# Patient Record
Sex: MALE | Race: WHITE | NOT HISPANIC OR LATINO | Employment: STUDENT | URBAN - METROPOLITAN AREA
[De-identification: names, ages, dates, MRNs, and addresses within clinical notes are randomized per-mention and may not be internally consistent; named-entity substitution may affect disease eponyms.]

---

## 2022-01-08 ENCOUNTER — HOSPITAL ENCOUNTER (EMERGENCY)
Facility: HOSPITAL | Age: 22
Discharge: HOME | End: 2022-01-08
Attending: EMERGENCY MEDICINE | Admitting: EMERGENCY MEDICINE

## 2022-01-08 ENCOUNTER — APPOINTMENT (OUTPATIENT)
Dept: CT IMAGING | Facility: HOSPITAL | Age: 22
End: 2022-01-08

## 2022-01-08 VITALS
SYSTOLIC BLOOD PRESSURE: 118 MMHG | HEART RATE: 82 BPM | DIASTOLIC BLOOD PRESSURE: 59 MMHG | TEMPERATURE: 99.2 F | BODY MASS INDEX: 22.43 KG/M2 | RESPIRATION RATE: 16 BRPM | OXYGEN SATURATION: 100 % | HEIGHT: 66 IN | WEIGHT: 139.55 LBS

## 2022-01-08 DIAGNOSIS — R19.7 DIARRHEA, UNSPECIFIED TYPE: ICD-10-CM

## 2022-01-08 DIAGNOSIS — K52.9 ENTERITIS: Primary | ICD-10-CM

## 2022-01-08 DIAGNOSIS — R10.84 GENERALIZED ABDOMINAL PAIN: ICD-10-CM

## 2022-01-08 LAB
ALBUMIN SERPL-MCNC: 3.7 G/DL (ref 3.5–5.2)
ALBUMIN/GLOB SERPL: 1.3 G/DL
ALP SERPL-CCNC: 85 U/L (ref 39–117)
ALT SERPL W P-5'-P-CCNC: 11 U/L (ref 1–41)
ANION GAP SERPL CALCULATED.3IONS-SCNC: 12 MMOL/L (ref 5–15)
AST SERPL-CCNC: 13 U/L (ref 1–40)
B PARAPERT DNA SPEC QL NAA+PROBE: NOT DETECTED
B PERT DNA SPEC QL NAA+PROBE: NOT DETECTED
BASOPHILS # BLD AUTO: 0 10*3/MM3 (ref 0–0.2)
BASOPHILS NFR BLD AUTO: 0.7 % (ref 0–1.5)
BILIRUB SERPL-MCNC: 0.2 MG/DL (ref 0–1.2)
BILIRUB UR QL STRIP: NEGATIVE
BUN SERPL-MCNC: 10 MG/DL (ref 6–20)
BUN/CREAT SERPL: 15.6 (ref 7–25)
C PNEUM DNA NPH QL NAA+NON-PROBE: NOT DETECTED
CALCIUM SPEC-SCNC: 8.8 MG/DL (ref 8.6–10.5)
CHLORIDE SERPL-SCNC: 102 MMOL/L (ref 98–107)
CLARITY UR: CLEAR
CO2 SERPL-SCNC: 25 MMOL/L (ref 22–29)
COLOR UR: YELLOW
CREAT SERPL-MCNC: 0.64 MG/DL (ref 0.76–1.27)
DEPRECATED RDW RBC AUTO: 40.7 FL (ref 37–54)
EOSINOPHIL # BLD AUTO: 0 10*3/MM3 (ref 0–0.4)
EOSINOPHIL NFR BLD AUTO: 0.4 % (ref 0.3–6.2)
ERYTHROCYTE [DISTWIDTH] IN BLOOD BY AUTOMATED COUNT: 12.8 % (ref 12.3–15.4)
FLUAV SUBTYP SPEC NAA+PROBE: NOT DETECTED
FLUBV RNA ISLT QL NAA+PROBE: NOT DETECTED
GFR SERPL CREATININE-BSD FRML MDRD: >150 ML/MIN/1.73
GLOBULIN UR ELPH-MCNC: 2.9 GM/DL
GLUCOSE SERPL-MCNC: 93 MG/DL (ref 65–99)
GLUCOSE UR STRIP-MCNC: NEGATIVE MG/DL
HADV DNA SPEC NAA+PROBE: NOT DETECTED
HCOV 229E RNA SPEC QL NAA+PROBE: NOT DETECTED
HCOV HKU1 RNA SPEC QL NAA+PROBE: NOT DETECTED
HCOV NL63 RNA SPEC QL NAA+PROBE: NOT DETECTED
HCOV OC43 RNA SPEC QL NAA+PROBE: NOT DETECTED
HCT VFR BLD AUTO: 42.2 % (ref 37.5–51)
HGB BLD-MCNC: 14.8 G/DL (ref 13–17.7)
HGB UR QL STRIP.AUTO: NEGATIVE
HMPV RNA NPH QL NAA+NON-PROBE: NOT DETECTED
HOLD SPECIMEN: NORMAL
HPIV1 RNA ISLT QL NAA+PROBE: NOT DETECTED
HPIV2 RNA SPEC QL NAA+PROBE: NOT DETECTED
HPIV3 RNA NPH QL NAA+PROBE: NOT DETECTED
HPIV4 P GENE NPH QL NAA+PROBE: NOT DETECTED
KETONES UR QL STRIP: NEGATIVE
LEUKOCYTE ESTERASE UR QL STRIP.AUTO: NEGATIVE
LYMPHOCYTES # BLD AUTO: 0.9 10*3/MM3 (ref 0.7–3.1)
LYMPHOCYTES NFR BLD AUTO: 18.9 % (ref 19.6–45.3)
M PNEUMO IGG SER IA-ACNC: NOT DETECTED
MCH RBC QN AUTO: 31.2 PG (ref 26.6–33)
MCHC RBC AUTO-ENTMCNC: 35.1 G/DL (ref 31.5–35.7)
MCV RBC AUTO: 88.9 FL (ref 79–97)
MONOCYTES # BLD AUTO: 0.8 10*3/MM3 (ref 0.1–0.9)
MONOCYTES NFR BLD AUTO: 16.8 % (ref 5–12)
NEUTROPHILS NFR BLD AUTO: 3 10*3/MM3 (ref 1.7–7)
NEUTROPHILS NFR BLD AUTO: 63.2 % (ref 42.7–76)
NITRITE UR QL STRIP: NEGATIVE
NRBC BLD AUTO-RTO: 0.1 /100 WBC (ref 0–0.2)
PH UR STRIP.AUTO: 5.5 [PH] (ref 5–8)
PLATELET # BLD AUTO: 217 10*3/MM3 (ref 140–450)
PMV BLD AUTO: 7.1 FL (ref 6–12)
POTASSIUM SERPL-SCNC: 4 MMOL/L (ref 3.5–5.2)
PROT SERPL-MCNC: 6.6 G/DL (ref 6–8.5)
PROT UR QL STRIP: NEGATIVE
RBC # BLD AUTO: 4.75 10*6/MM3 (ref 4.14–5.8)
RHINOVIRUS RNA SPEC NAA+PROBE: NOT DETECTED
RSV RNA NPH QL NAA+NON-PROBE: NOT DETECTED
SARS-COV-2 RNA NPH QL NAA+NON-PROBE: NOT DETECTED
SODIUM SERPL-SCNC: 139 MMOL/L (ref 136–145)
SP GR UR STRIP: 1.03 (ref 1–1.03)
UROBILINOGEN UR QL STRIP: ABNORMAL
WBC NRBC COR # BLD: 4.8 10*3/MM3 (ref 3.4–10.8)
WHOLE BLOOD HOLD SPECIMEN: NORMAL

## 2022-01-08 PROCEDURE — 81003 URINALYSIS AUTO W/O SCOPE: CPT | Performed by: EMERGENCY MEDICINE

## 2022-01-08 PROCEDURE — 99283 EMERGENCY DEPT VISIT LOW MDM: CPT

## 2022-01-08 PROCEDURE — 85025 COMPLETE CBC W/AUTO DIFF WBC: CPT | Performed by: EMERGENCY MEDICINE

## 2022-01-08 PROCEDURE — 80053 COMPREHEN METABOLIC PANEL: CPT | Performed by: EMERGENCY MEDICINE

## 2022-01-08 PROCEDURE — 0202U NFCT DS 22 TRGT SARS-COV-2: CPT | Performed by: EMERGENCY MEDICINE

## 2022-01-08 PROCEDURE — 0 IOPAMIDOL PER 1 ML: Performed by: EMERGENCY MEDICINE

## 2022-01-08 PROCEDURE — 74177 CT ABD & PELVIS W/CONTRAST: CPT

## 2022-01-08 RX ORDER — SODIUM CHLORIDE 0.9 % (FLUSH) 0.9 %
10 SYRINGE (ML) INJECTION AS NEEDED
Status: DISCONTINUED | OUTPATIENT
Start: 2022-01-08 | End: 2022-01-08 | Stop reason: HOSPADM

## 2022-01-08 RX ADMIN — IOPAMIDOL 100 ML: 755 INJECTION, SOLUTION INTRAVENOUS at 13:52

## 2022-01-08 RX ADMIN — SODIUM CHLORIDE 1000 ML: 9 INJECTION, SOLUTION INTRAVENOUS at 12:33

## 2022-01-08 NOTE — ED NOTES
Pt c/o abdominal pain, diarrhea, fever, body aches, congestion, and chills. Pt negative Flu and COVID test from [facility]. Pt thinks he has appendicitis.     Blaire Ding, RN  01/08/22 1208       Blaire Ding, RN  01/08/22 121

## 2022-01-08 NOTE — ED PROVIDER NOTES
Subjective   History of Present Illness  21-year-old male without prior medical problems had an onset 4 days ago of a high fever associated with diarrhea. He had some lower abdominal pain that appeared to be in the mid abdomen. He did not have any melena or hematochezia. He did not have any nausea or vomiting. He has been exposed to Covid within the family but had a negative Covid test 2 days ago. He denies any difficulties with urination. The patient has not had any previous abdominal surgery.  Review of Systems   Constitutional: Positive for fever.   HENT: Negative.    Eyes: Negative.    Respiratory: Negative.    Cardiovascular: Negative.    Gastrointestinal: Positive for abdominal pain and diarrhea.   Endocrine: Negative.    Genitourinary: Negative.    Musculoskeletal: Negative.    Skin: Negative.    Allergic/Immunologic: Negative.    Neurological: Negative.    Hematological: Negative.    Psychiatric/Behavioral: Negative.        History reviewed. No pertinent past medical history.    Allergies   Allergen Reactions   • Clindamycin/Lincomycin Anaphylaxis   • Penicillins Rash       History reviewed. No pertinent surgical history.    History reviewed. No pertinent family history.    Social History     Socioeconomic History   • Marital status: Single   Tobacco Use   • Smoking status: Never Smoker   • Smokeless tobacco: Never Used   Substance and Sexual Activity   • Alcohol use: Not Currently   • Drug use: Never   • Sexual activity: Defer           Objective   Physical Exam  Patient is awake and alert she was afebrile his vital signs are stable his O2 sats 100% he HEENT exam is unremarkable with no scleral icterus mucous membranes are moist the neck is supple his chest is clear cardiovascular exam reveals a regular rhythm without a gallop or murmur his abdomen is nondistended bowel sounds are positive he is minimal tenderness in the midline just below the umbilicus. The patient has no CVAT there is no guarding rebound  he has no hernia patient has no generalized rash  Procedures           ED Course           Results for orders placed or performed during the hospital encounter of 01/08/22   Respiratory Panel PCR w/COVID-19(SARS-CoV-2) [facility] In-House, NP Swab in UTM/VTM, 3-4 HR TAT - Swab, Nasopharynx    Specimen: Nasopharynx; Swab   Result Value Ref Range    ADENOVIRUS, PCR Not Detected Not Detected    Coronavirus 229E Not Detected Not Detected    Coronavirus HKU1 Not Detected Not Detected    Coronavirus NL63 Not Detected Not Detected    Coronavirus OC43 Not Detected Not Detected    COVID19 Not Detected Not Detected - Ref. Range    Human Metapneumovirus Not Detected Not Detected    Human Rhinovirus/Enterovirus Not Detected Not Detected    Influenza A PCR Not Detected Not Detected    Influenza B PCR Not Detected Not Detected    Parainfluenza Virus 1 Not Detected Not Detected    Parainfluenza Virus 2 Not Detected Not Detected    Parainfluenza Virus 3 Not Detected Not Detected    Parainfluenza Virus 4 Not Detected Not Detected    RSV, PCR Not Detected Not Detected    Bordetella pertussis pcr Not Detected Not Detected    Bordetella parapertussis PCR Not Detected Not Detected    Chlamydophila pneumoniae PCR Not Detected Not Detected    Mycoplasma pneumo by PCR Not Detected Not Detected   Comprehensive Metabolic Panel    Specimen: Blood   Result Value Ref Range    Glucose 93 65 - 99 mg/dL    BUN 10 6 - 20 mg/dL    Creatinine 0.64 (L) 0.76 - 1.27 mg/dL    Sodium 139 136 - 145 mmol/L    Potassium 4.0 3.5 - 5.2 mmol/L    Chloride 102 98 - 107 mmol/L    CO2 25.0 22.0 - 29.0 mmol/L    Calcium 8.8 8.6 - 10.5 mg/dL    Total Protein 6.6 6.0 - 8.5 g/dL    Albumin 3.70 3.50 - 5.20 g/dL    ALT (SGPT) 11 1 - 41 U/L    AST (SGOT) 13 1 - 40 U/L    Alkaline Phosphatase 85 39 - 117 U/L    Total Bilirubin 0.2 0.0 - 1.2 mg/dL    eGFR Non African Amer >150 >60 mL/min/1.73    Globulin 2.9 gm/dL    A/G Ratio 1.3 g/dL    BUN/Creatinine  Ratio 15.6 7.0 - 25.0    Anion Gap 12.0 5.0 - 15.0 mmol/L   Urinalysis With Microscopic If Indicated (No Culture) - Urine, Clean Catch    Specimen: Urine, Clean Catch   Result Value Ref Range    Color, UA Yellow Yellow, Straw    Appearance, UA Clear Clear    pH, UA 5.5 5.0 - 8.0    Specific Gravity, UA 1.031 (H) 1.005 - 1.030    Glucose, UA Negative Negative    Ketones, UA Negative Negative    Bilirubin, UA Negative Negative    Blood, UA Negative Negative    Protein, UA Negative Negative    Leuk Esterase, UA Negative Negative    Nitrite, UA Negative Negative    Urobilinogen, UA 0.2 E.U./dL 0.2 - 1.0 E.U./dL   CBC Auto Differential    Specimen: Blood   Result Value Ref Range    WBC 4.80 3.40 - 10.80 10*3/mm3    RBC 4.75 4.14 - 5.80 10*6/mm3    Hemoglobin 14.8 13.0 - 17.7 g/dL    Hematocrit 42.2 37.5 - 51.0 %    MCV 88.9 79.0 - 97.0 fL    MCH 31.2 26.6 - 33.0 pg    MCHC 35.1 31.5 - 35.7 g/dL    RDW 12.8 12.3 - 15.4 %    RDW-SD 40.7 37.0 - 54.0 fl    MPV 7.1 6.0 - 12.0 fL    Platelets 217 140 - 450 10*3/mm3    Neutrophil % 63.2 42.7 - 76.0 %    Lymphocyte % 18.9 (L) 19.6 - 45.3 %    Monocyte % 16.8 (H) 5.0 - 12.0 %    Eosinophil % 0.4 0.3 - 6.2 %    Basophil % 0.7 0.0 - 1.5 %    Neutrophils, Absolute 3.00 1.70 - 7.00 10*3/mm3    Lymphocytes, Absolute 0.90 0.70 - 3.10 10*3/mm3    Monocytes, Absolute 0.80 0.10 - 0.90 10*3/mm3    Eosinophils, Absolute 0.00 0.00 - 0.40 10*3/mm3    Basophils, Absolute 0.00 0.00 - 0.20 10*3/mm3    nRBC 0.1 0.0 - 0.2 /100 WBC   Gold Top - SST   Result Value Ref Range    Extra Tube Hold for add-ons.    Light Blue Top   Result Value Ref Range    Extra Tube hold for add-on      Medications   sodium chloride 0.9 % flush 10 mL (has no administration in time range)   sodium chloride 0.9 % bolus 1,000 mL (0 mL Intravenous Stopped 1/8/22 1408)   iopamidol (ISOVUE-370) 76 % injection 100 mL (100 mL Intravenous Given 1/8/22 1352)     CT Abdomen Pelvis With Contrast    Result Date: 1/8/2022  Small  volume free fluid in the pelvis, which is abnormal, but of uncertain etiology. This may indicate a nonspecific inflammatory process.  Electronically Signed By-Butch Cutler MD On:1/8/2022 2:10 PM This report was finalized on [id number] by  Butch Cutler MD.                                          MDM  The patient's in the emergency department was minimal.  His white count was only 4800.  The patient's urine was clear.  CT scan shows a small amount of fluid in the pelvis on the left.  With the fever the diarrhea and abdominal pain I suspect the patient had a viral enteritis.  He states that he had a bowel movement today that was not loose.  He states that his pain is minimal.  He will be allowed to go home and is to follow-up with his doctors this coming week  Final diagnoses:   Enteritis   Generalized abdominal pain   Diarrhea, unspecified type       ED Disposition  ED Disposition     None          No follow-up provider specified.       Medication List      No changes were made to your prescriptions during this visit.          Alonso Au MD  01/08/22 1904

## 2022-01-08 NOTE — DISCHARGE INSTRUCTIONS
Alternate Tylenol and ibuprofen every 3 hours if needed for fever control and/or pain.  Imodium if needed for diarrhea.  Follow-up with your primary care provider in the coming week